# Patient Record
Sex: MALE | Race: WHITE | Employment: UNEMPLOYED | URBAN - METROPOLITAN AREA
[De-identification: names, ages, dates, MRNs, and addresses within clinical notes are randomized per-mention and may not be internally consistent; named-entity substitution may affect disease eponyms.]

---

## 2017-02-09 ENCOUNTER — TELEPHONE (OUTPATIENT)
Dept: FAMILY MEDICINE CLINIC | Age: 72
End: 2017-02-09

## 2017-02-09 DIAGNOSIS — Z01.00 ROUTINE EYE EXAM: Primary | ICD-10-CM

## 2017-02-09 DIAGNOSIS — Z98.42 S/P CATARACT EXTRACTION, LEFT: ICD-10-CM

## 2017-04-17 ENCOUNTER — OFFICE VISIT (OUTPATIENT)
Dept: FAMILY MEDICINE CLINIC | Age: 72
End: 2017-04-17

## 2017-04-17 VITALS
RESPIRATION RATE: 12 BRPM | DIASTOLIC BLOOD PRESSURE: 70 MMHG | TEMPERATURE: 97.2 F | HEIGHT: 66 IN | HEART RATE: 72 BPM | BODY MASS INDEX: 34.07 KG/M2 | SYSTOLIC BLOOD PRESSURE: 138 MMHG | WEIGHT: 212 LBS

## 2017-04-17 DIAGNOSIS — L30.8 OTHER ECZEMA: ICD-10-CM

## 2017-04-17 DIAGNOSIS — G20 PARKINSON'S DISEASE (HCC): ICD-10-CM

## 2017-04-17 DIAGNOSIS — E03.9 ACQUIRED HYPOTHYROIDISM: Primary | ICD-10-CM

## 2017-04-17 DIAGNOSIS — Z23 NEED FOR PNEUMOCOCCAL VACCINATION: ICD-10-CM

## 2017-04-17 DIAGNOSIS — Z86.718 PERSONAL HISTORY OF DVT (DEEP VEIN THROMBOSIS): ICD-10-CM

## 2017-04-17 PROCEDURE — 99213 OFFICE O/P EST LOW 20 MIN: CPT | Performed by: FAMILY MEDICINE

## 2017-04-17 PROCEDURE — 90732 PPSV23 VACC 2 YRS+ SUBQ/IM: CPT | Performed by: FAMILY MEDICINE

## 2017-04-17 PROCEDURE — G0009 ADMIN PNEUMOCOCCAL VACCINE: HCPCS | Performed by: FAMILY MEDICINE

## 2017-04-17 RX ORDER — MULTIVITAMIN WITH IRON
100 TABLET ORAL DAILY
COMMUNITY

## 2017-04-17 RX ORDER — MULTIVIT-MIN/FA/LYCOPEN/LUTEIN .4-300-25
1 TABLET ORAL DAILY
COMMUNITY

## 2017-04-17 RX ORDER — TRIAMCINOLONE ACETONIDE 1 MG/G
CREAM TOPICAL
Qty: 28.4 G | Refills: 2 | Status: SHIPPED | OUTPATIENT
Start: 2017-04-17

## 2017-04-26 ASSESSMENT — ENCOUNTER SYMPTOMS
COUGH: 0
SHORTNESS OF BREATH: 0

## 2017-08-14 ENCOUNTER — OFFICE VISIT (OUTPATIENT)
Dept: FAMILY MEDICINE CLINIC | Age: 72
End: 2017-08-14

## 2017-08-14 VITALS
DIASTOLIC BLOOD PRESSURE: 78 MMHG | WEIGHT: 214 LBS | HEART RATE: 71 BPM | BODY MASS INDEX: 34.39 KG/M2 | TEMPERATURE: 97.8 F | SYSTOLIC BLOOD PRESSURE: 122 MMHG | RESPIRATION RATE: 14 BRPM | HEIGHT: 66 IN

## 2017-08-14 DIAGNOSIS — R07.89 ATYPICAL CHEST PAIN: ICD-10-CM

## 2017-08-14 DIAGNOSIS — I82.501 CHRONIC DEEP VEIN THROMBOSIS (DVT) OF RIGHT LOWER EXTREMITY, UNSPECIFIED VEIN (HCC): ICD-10-CM

## 2017-08-14 DIAGNOSIS — E03.9 ACQUIRED HYPOTHYROIDISM: Primary | ICD-10-CM

## 2017-08-14 DIAGNOSIS — G20 PARKINSON'S DISEASE (HCC): ICD-10-CM

## 2017-08-14 PROCEDURE — 93000 ELECTROCARDIOGRAM COMPLETE: CPT | Performed by: FAMILY MEDICINE

## 2017-08-14 PROCEDURE — 99213 OFFICE O/P EST LOW 20 MIN: CPT | Performed by: FAMILY MEDICINE

## 2017-08-14 RX ORDER — MULTIVITAMIN WITH IRON
250 TABLET ORAL DAILY
COMMUNITY
End: 2018-08-17 | Stop reason: ALTCHOICE

## 2017-08-14 ASSESSMENT — PATIENT HEALTH QUESTIONNAIRE - PHQ9
SUM OF ALL RESPONSES TO PHQ QUESTIONS 1-9: 0
SUM OF ALL RESPONSES TO PHQ9 QUESTIONS 1 & 2: 0
1. LITTLE INTEREST OR PLEASURE IN DOING THINGS: 0
2. FEELING DOWN, DEPRESSED OR HOPELESS: 0

## 2017-08-27 ASSESSMENT — ENCOUNTER SYMPTOMS
ABDOMINAL PAIN: 0
BACK PAIN: 1
COUGH: 0
CHEST TIGHTNESS: 1
BLOOD IN STOOL: 0
SHORTNESS OF BREATH: 0

## 2017-09-27 ENCOUNTER — OFFICE VISIT (OUTPATIENT)
Dept: FAMILY MEDICINE CLINIC | Age: 72
End: 2017-09-27

## 2017-09-27 VITALS
RESPIRATION RATE: 14 BRPM | BODY MASS INDEX: 34.72 KG/M2 | TEMPERATURE: 97.2 F | WEIGHT: 216 LBS | DIASTOLIC BLOOD PRESSURE: 78 MMHG | HEART RATE: 71 BPM | SYSTOLIC BLOOD PRESSURE: 130 MMHG | HEIGHT: 66 IN

## 2017-09-27 DIAGNOSIS — G20 PARKINSON'S DISEASE (HCC): Primary | ICD-10-CM

## 2017-09-27 DIAGNOSIS — E03.9 ACQUIRED HYPOTHYROIDISM: ICD-10-CM

## 2017-09-27 DIAGNOSIS — Z12.5 PROSTATE CANCER SCREENING: ICD-10-CM

## 2017-09-27 DIAGNOSIS — E53.8 B12 DEFICIENCY: ICD-10-CM

## 2017-09-27 PROCEDURE — 99213 OFFICE O/P EST LOW 20 MIN: CPT | Performed by: FAMILY MEDICINE

## 2017-09-27 NOTE — PROGRESS NOTES
(MAGNESIUM-OXIDE) 250 MG TABS tablet Take 250 mg by mouth daily      triamcinolone (KENALOG) 0.1 % cream Apply topically 2 times daily. 28.4 g 2    [DISCONTINUED] levothyroxine (SYNTHROID) 88 MCG tablet Take 1 tab qod. Yenny Jemima w/ 100mcg] 45 tablet 3    meclizine (ANTIVERT) 12.5 MG tablet Take 1 or 2 tabs bid pc for dizziness [for dizziness] 60 tablet 2    DHA-EPA-VITAMIN E PO Take 1 capsule by mouth       No facility-administered encounter medications on file as of 9/27/2017. Orders Placed This Encounter   Procedures    Psa screening     Standing Status:   Future     Standing Expiration Date:   9/27/2018    Comprehensive Metabolic Panel     Standing Status:   Future     Standing Expiration Date:   9/27/2018    Vitamin B12     Standing Status:   Future     Standing Expiration Date:   9/27/2018     No orders of the defined types were placed in this encounter. Medications Discontinued During This Encounter   Medication Reason    levothyroxine (SYNTHROID) 88 MCG tablet      Return in about 3 months (around 12/27/2017) for thyroid. Call or return to clinic prn if these symptoms worsen or fail to improve as anticipated.       Chanell Clarke, DO

## 2017-09-27 NOTE — MR AVS SNAPSHOT
After Visit Summary             Catrina Collins   2017 9:00 AM   Office Visit    Description:  Male : 1945   Provider:  Chanell Clarke DO   Department:  [facility]              Your Follow-Up and Future Appointments         Below is a list of your follow-up and future appointments. This may not be a complete list as you may have made appointments directly with providers that we are not aware of or your providers may have made some for you. Please call your providers to confirm appointments. It is important to keep your appointments. Please bring your current insurance card, photo ID, co-pay, and all medication bottles to your appointment. If self-pay, payment is expected at the time of service. Your To-Do List     Future Appointments Provider Department Dept Phone    2017 9:30 AM DO Ari[facility] [phone]    Please arrive 15 minutes prior to appointment, bring photo ID and insurance card. Future Orders Complete By Expires    Psa screening [[id number] Custom]  2018    Vitamin B12 [LAB67 Custom]  2018    Comprehensive Metabolic Panel [GZV07 Custom]  2018    Follow-Up    Return in about 3 months (around 2017). Information from Your Visit        Department     Name Address Phone Fax    [facility] PCP [address] [facility]. [facility] AT [facility] [address] [phone]      You Were Seen for:         Comments    Parkinson's disease [facility])   [[id number]]         Vital Signs     Blood Pressure Pulse Temperature Respirations Height Weight    130/78 71 97.2 °F (36.2 °C) (Temporal) 14 5' 6\" (1.676 m) 216 lb (98 kg)    Body Mass Index Smoking Status                34.86 kg/m2 Never Smoker          Additional Information about your Body Mass Index (BMI)           Your BMI as listed above is considered obese (30 or more).  BMI is an estimate of body fat, calculated from your height and weight. The higher your BMI, the greater your risk of heart disease, high blood pressure, type 2 diabetes, stroke, gallstones, arthritis, sleep apnea, and certain cancers. BMI is not perfect. It may overestimate body fat in athletes and people who are more muscular. Even a small weight loss (between 5 and 10 percent of your current weight) by decreasing your calorie intake and becoming more physically active will help lower your risk of developing or worsening diseases associated with obesity. Learn more at: [employer]             Medications and Orders      Your Current Medications Are              Misc Natural Products (FIBER 7 PO) Take by mouth    Coenzyme Q10 (COQ-10) 100 MG CAPS Take by mouth    COCONUT OIL PO Take by mouth    levothyroxine (SYNTHROID) 100 MCG tablet TAKE ONE TABLET BY MOUTH DAILY    Pyridoxine HCl (VITAMIN B-6) 100 MG tablet Take 100 mg by mouth daily    Multiple Vitamins-Minerals (CENTRUM SILVER ADULT 50+) TABS Take 1 tablet by mouth daily    selegiline (ELDEPRYL) 5 MG capsule Take 1 capsule by mouth daily    levothyroxine (SYNTHROID) 88 MCG tablet Take 1 tab qod. Amelia [place] w/ 100mcg]    amantadine (SYMMETREL) 100 MG capsule Take 1 capsule by mouth 2 times daily    aspirin 81 MG tablet Take 81 mg by mouth 2 times daily     CYANOCOBALAMIN PO Take by mouth    Omega-3 Fatty Acids (FISH OIL) 1000 MG CAPS Take 1 capsule by mouth    magnesium (MAGNESIUM-OXIDE) 250 MG TABS tablet Take 250 mg by mouth daily    triamcinolone (KENALOG) 0.1 % cream Apply topically 2 times daily.     meclizine (ANTIVERT) 12.5 MG tablet Take 1 or 2 tabs bid pc for dizziness [for dizziness]    DHA-EPA-VITAMIN E PO Take 1 capsule by mouth      Allergies              Amoxicillin Shortness Of Breath, Swelling    Quinine Derivatives Shortness Of Breath, Swelling         Additional Information        Basic Information later time if you forget your password. 8. Enter your e-mail address. You will receive e-mail notification when new information is available in [address] 9. Click Sign Up. You can now view your medical record. Additional Information  If you have questions, please contact the physician practice where you receive care. Remember, [employer] is NOT to be used for urgent needs. For medical emergencies, dial 911. For questions regarding your [employer] account call [phone]. If you have a clinical question, please call your doctor's office.

## 2017-10-05 ASSESSMENT — ENCOUNTER SYMPTOMS
SHORTNESS OF BREATH: 0
BACK PAIN: 1
BLOOD IN STOOL: 0
ABDOMINAL PAIN: 0
COUGH: 0
NAUSEA: 0

## 2017-12-18 DIAGNOSIS — Z12.5 PROSTATE CANCER SCREENING: ICD-10-CM

## 2017-12-18 DIAGNOSIS — G20 PARKINSON'S DISEASE (HCC): ICD-10-CM

## 2017-12-18 DIAGNOSIS — E53.8 B12 DEFICIENCY: ICD-10-CM

## 2017-12-18 LAB
ALBUMIN SERPL-MCNC: 4.3 G/DL (ref 3.9–4.9)
ALP BLD-CCNC: 48 U/L (ref 35–104)
ALT SERPL-CCNC: 45 U/L (ref 0–41)
ANION GAP SERPL CALCULATED.3IONS-SCNC: 17 MEQ/L (ref 7–13)
AST SERPL-CCNC: 44 U/L (ref 0–40)
BILIRUB SERPL-MCNC: 1 MG/DL (ref 0–1.2)
BUN BLDV-MCNC: 15 MG/DL (ref 8–23)
CALCIUM SERPL-MCNC: 9.1 MG/DL (ref 8.6–10.2)
CHLORIDE BLD-SCNC: 101 MEQ/L (ref 98–107)
CO2: 25 MEQ/L (ref 22–29)
CREAT SERPL-MCNC: 0.69 MG/DL (ref 0.7–1.2)
GFR AFRICAN AMERICAN: >60
GFR NON-AFRICAN AMERICAN: >60
GLOBULIN: 3.2 G/DL (ref 2.3–3.5)
GLUCOSE BLD-MCNC: 96 MG/DL (ref 74–109)
POTASSIUM SERPL-SCNC: 5.2 MEQ/L (ref 3.5–5.1)
PROSTATE SPECIFIC ANTIGEN: 0.45 NG/ML (ref 0–6.22)
SODIUM BLD-SCNC: 143 MEQ/L (ref 132–144)
TOTAL PROTEIN: 7.5 G/DL (ref 6.4–8.1)
VITAMIN B-12: 855 PG/ML (ref 211–946)

## 2017-12-20 ENCOUNTER — OFFICE VISIT (OUTPATIENT)
Dept: FAMILY MEDICINE CLINIC | Age: 72
End: 2017-12-20

## 2017-12-20 VITALS — BODY MASS INDEX: 35.2 KG/M2 | HEIGHT: 66 IN | WEIGHT: 219 LBS

## 2017-12-20 DIAGNOSIS — G20 PARKINSON'S DISEASE (HCC): ICD-10-CM

## 2017-12-20 DIAGNOSIS — R60.9 PERIPHERAL EDEMA: ICD-10-CM

## 2017-12-20 DIAGNOSIS — B96.89 ACUTE BACTERIAL SINUSITIS: ICD-10-CM

## 2017-12-20 DIAGNOSIS — E03.9 ACQUIRED HYPOTHYROIDISM: Primary | ICD-10-CM

## 2017-12-20 DIAGNOSIS — J01.90 ACUTE BACTERIAL SINUSITIS: ICD-10-CM

## 2017-12-20 DIAGNOSIS — I87.2 CHRONIC VENOUS INSUFFICIENCY: ICD-10-CM

## 2017-12-20 PROCEDURE — 99213 OFFICE O/P EST LOW 20 MIN: CPT | Performed by: FAMILY MEDICINE

## 2017-12-20 RX ORDER — TRIAMTERENE AND HYDROCHLOROTHIAZIDE 37.5; 25 MG/1; MG/1
TABLET ORAL
Qty: 15 TABLET | Refills: 3 | Status: SHIPPED | OUTPATIENT
Start: 2017-12-20 | End: 2018-05-09

## 2017-12-20 RX ORDER — SULFAMETHOXAZOLE AND TRIMETHOPRIM 800; 160 MG/1; MG/1
1 TABLET ORAL 2 TIMES DAILY
Qty: 14 TABLET | Refills: 0 | Status: SHIPPED | OUTPATIENT
Start: 2017-12-20 | End: 2017-12-27

## 2017-12-20 NOTE — PROGRESS NOTES
Subjective:      Patient ID: Michael Green is a 74 y.o. male.     HPI    Review of Systems    Objective:   Physical Exam    Assessment:            Plan:

## 2017-12-20 NOTE — PROGRESS NOTES
Subjective  Ned Baron, 74 y.o. male presents today with:  Chief Complaint   Patient presents with    Hypothyroidism     f/u     Skin Problem     sore on anterior left leg       HPI  Patient presents today for a follow up for hypothyroidism. Has sl more bilat lower leg swelling[NO clark/palp/cp]  Small red area in left lower shin  More coryza and sinus ha  Needs new neuro--no increase leg weakness on 2-parkinsons rxs  Alt 88/100 LT4-no s-e  Rev/rxs; No abuse nor side effects on meds   No unilateral leg swelling/pain  Review of Systems   Constitutional: Negative for fatigue and fever. HENT: Positive for congestion, rhinorrhea, sinus pressure and sore throat. Negative for tinnitus, trouble swallowing and voice change. Respiratory: Negative for cough, shortness of breath and wheezing. Cardiovascular: Positive for leg swelling (bilat). Negative for chest pain and palpitations. Gastrointestinal: Negative for abdominal pain, blood in stool and nausea. Genitourinary: Positive for frequency and urgency. Negative for dysuria and flank pain. Musculoskeletal: Positive for arthralgias, back pain and gait problem. Skin: Positive for rash. Neurological: Positive for weakness. Negative for light-headedness and headaches.        Allergies   Allergen Reactions    Amoxicillin Shortness Of Breath and Swelling    Quinine Derivatives Shortness Of Breath and Swelling       Past Medical History:   Diagnosis Date    History of hypothyroidism     Hypothyroidism     Parkinson's disease ([address]     Right leg DVT ([address] 7/14/15    Vertigo 11/10/2014     Past Surgical History:   Procedure Laterality Date    CARPAL TUNNEL RELEASE  1996    [facility]    [facility]    COLONOSCOPY  10/17/2016    [facility] MD  5 YEARS    TONSILLECTOMY AND ADENOIDECTOMY      CHILDHOOD    VASECTOMY  1970     Social History     Social History    Marital status:      Spouse name: N/A    Number of children: N/A    Years of education: N/A     Occupational History    Not on file. Social History Main Topics    Smoking status: Never Smoker    Smokeless tobacco: Never Used    Alcohol use No    Drug use: No    Sexual activity: Not on file     Other Topics Concern    Not on file     Social History Narrative    No narrative on file     Family History   Problem Relation Age of Onset    Alzheimer's Disease Mother     Diabetes Father     Other Father      GOUT    Heart Disease Father     High Blood Pressure Father           Current Outpatient Prescriptions on File Prior to Visit   Medication Sig Dispense Refill    Misc Natural Products (FIBER 7 PO) Take by mouth      Coenzyme Q10 (COQ-10) 100 MG CAPS Take by mouth      COCONUT OIL PO Take by mouth      magnesium (MAGNESIUM-OXIDE) 250 MG TABS tablet Take 250 mg by mouth daily      levothyroxine (SYNTHROID) 100 MCG tablet TAKE ONE TABLET BY MOUTH DAILY 60 tablet 3    Pyridoxine HCl (VITAMIN B-6) 100 MG tablet Take 100 mg by mouth daily      amantadine (SYMMETREL) 100 MG capsule Take 1 capsule by mouth 2 times daily 180 capsule 3    DHA-EPA-VITAMIN E PO Take 1 capsule by mouth      aspirin 81 MG tablet Take 81 mg by mouth 2 times daily       CYANOCOBALAMIN PO Take by mouth      Omega-3 Fatty Acids (FISH OIL) 1000 MG CAPS Take 1 capsule by mouth      Multiple Vitamins-Minerals (CENTRUM SILVER ADULT 50+) TABS Take 1 tablet by mouth daily      triamcinolone (KENALOG) 0.1 % cream Apply topically 2 times daily. 28.4 g 2    meclizine (ANTIVERT) 12.5 MG tablet Take 1 or 2 tabs bid pc for dizziness [for dizziness] 60 tablet 2     No current facility-administered medications on file prior to visit. Objective    Vitals:    12/20/17 0906   Weight: 219 lb (99.3 kg)   Height: 5' 6\" (1.676 m)     Physical Exam   Constitutional: He is oriented to person, place, and time and well-developed, well-nourished, and in no distress. No distress. HENT:   Right Ear: Ear canal normal. Tympanic membrane is retracted. Tympanic membrane is not injected. Decreased hearing is noted. Left Ear: Ear canal normal. Tympanic membrane is retracted. Tympanic membrane is not injected. Decreased hearing is noted. Nose: Mucosal edema, rhinorrhea and septal deviation present. Right sinus exhibits maxillary sinus tenderness. Mouth/Throat: No oral lesions. Posterior oropharyngeal erythema present. No oropharyngeal exudate. Eyes: No scleral icterus. Neck: Normal range of motion. Neck supple. Carotid bruit is not present. No neck rigidity. No thyroid mass and no thyromegaly present. Cardiovascular: Normal rate, regular rhythm, S1 normal, S2 normal and normal heart sounds. No extrasystoles are present. No murmur heard. Pulses:       Dorsalis pedis pulses are 2+ on the right side, and 2+ on the left side. Posterior tibial pulses are 2+ on the right side, and 2+ on the left side. Pulmonary/Chest: Effort normal and breath sounds normal.   Musculoskeletal: He exhibits edema (1/4 pitting edema both lower legs-neg homans). Neurological: He is alert and oriented to person, place, and time. He has normal sensation and intact cranial nerves. Gait (slow-steady) abnormal.   Skin: Petechiae (from venous madi-nonblanching and NO warmth-homans) noted. He is not diaphoretic. Psychiatric: Mood, memory and affect normal.     Orders Only on 12/18/2017   Component Date Value Ref Range Status    PSA 12/18/2017 0.45  0.00 - 6.22 ng/mL Final    Comment: When the Total PSA is between 3.00 and 10.00 ng/mL, consider  requesting a Free PSA to aid in diagnosis.  Sodium 12/18/2017 143  132 - 144 mEq/L Final    Comment: Revert to previous reference range. Effective:  12/14/2017      Potassium 12/18/2017 5.2* 3.5 - 5.1 mEq/L Final    Comment: Revert to previous reference range.   Effective:  12/14/2017      Chloride 12/18/2017 101  98 - 107 mEq/L Final effects and use reviewed-call if ineffective or problems ; Outpatient Encounter Prescriptions as of 2017   Medication Sig Dispense Refill    [] sulfamethoxazole-trimethoprim (BACTRIM DS;SEPTRA DS) 800-160 MG per tablet Take 1 tablet by mouth 2 times daily for 7 days Take with food 14 tablet 0    triamterene-hydrochlorothiazide (MAXZIDE-25) 37.5-25 MG per tablet Take 1 tab qod for swelling 15 tablet 3    Misc Natural Products (FIBER 7 PO) Take by mouth      Coenzyme Q10 (COQ-10) 100 MG CAPS Take by mouth      COCONUT OIL PO Take by mouth      magnesium (MAGNESIUM-OXIDE) 250 MG TABS tablet Take 250 mg by mouth daily      levothyroxine (SYNTHROID) 100 MCG tablet TAKE ONE TABLET BY MOUTH DAILY 60 tablet 3    Pyridoxine HCl (VITAMIN B-6) 100 MG tablet Take 100 mg by mouth daily      amantadine (SYMMETREL) 100 MG capsule Take 1 capsule by mouth 2 times daily 180 capsule 3    [DISCONTINUED] selegiline (ELDEPRYL) 5 MG capsule Take 1 capsule by mouth daily 90 capsule 3    DHA-EPA-VITAMIN E PO Take 1 capsule by mouth      aspirin 81 MG tablet Take 81 mg by mouth 2 times daily       CYANOCOBALAMIN PO Take by mouth      Omega-3 Fatty Acids (FISH OIL) 1000 MG CAPS Take 1 capsule by mouth      Multiple Vitamins-Minerals (CENTRUM SILVER ADULT 50+) TABS Take 1 tablet by mouth daily      triamcinolone (KENALOG) 0.1 % cream Apply topically 2 times daily. 28.4 g 2    meclizine (ANTIVERT) 12.5 MG tablet Take 1 or 2 tabs bid pc for dizziness [for dizziness] 60 tablet 2     No facility-administered encounter medications on file as of 2017. Call if legs worse;reduce diet-salt  Qod diuretic. ..   Lab next  Orders Placed This Encounter   Procedures    TSH without Reflex     Standing Status:   Future     Standing Expiration Date:   2018    Basic Metabolic Panel     Standing Status:   Future     Standing Expiration Date:   2018     Orders Placed This Encounter   Medications   

## 2017-12-27 DIAGNOSIS — G20 PARKINSON'S DISEASE (HCC): ICD-10-CM

## 2017-12-27 RX ORDER — SELEGILINE HYDROCHLORIDE 5 MG/1
5 CAPSULE ORAL DAILY
Qty: 90 CAPSULE | Refills: 3 | OUTPATIENT
Start: 2017-12-27

## 2017-12-28 ASSESSMENT — ENCOUNTER SYMPTOMS
NAUSEA: 0
WHEEZING: 0
VOICE CHANGE: 0
COUGH: 0
SINUS PRESSURE: 1
SORE THROAT: 1
ABDOMINAL PAIN: 0
BACK PAIN: 1
SHORTNESS OF BREATH: 0
TROUBLE SWALLOWING: 0
RHINORRHEA: 1
BLOOD IN STOOL: 0

## 2018-01-03 ENCOUNTER — TELEPHONE (OUTPATIENT)
Dept: FAMILY MEDICINE CLINIC | Age: 73
End: 2018-01-03

## 2018-01-03 DIAGNOSIS — E03.9 ACQUIRED HYPOTHYROIDISM: ICD-10-CM

## 2018-01-03 RX ORDER — LEVOTHYROXINE SODIUM 88 UG/1
TABLET ORAL
Qty: 45 TABLET | Refills: 3 | Status: SHIPPED | OUTPATIENT
Start: 2018-01-03

## 2018-01-03 NOTE — TELEPHONE ENCOUNTER
Pt wife requesting refill of Levothyroxine 88 mcg. She was advised chart states 100 mcg but she states Doctor has him taking both strengths.   Send to [employer] [facility]

## 2018-01-18 DIAGNOSIS — E03.9 ACQUIRED HYPOTHYROIDISM: ICD-10-CM

## 2018-01-18 DIAGNOSIS — R60.9 PERIPHERAL EDEMA: ICD-10-CM

## 2018-01-18 LAB
ANION GAP SERPL CALCULATED.3IONS-SCNC: 15 MEQ/L (ref 7–13)
BUN BLDV-MCNC: 19 MG/DL (ref 8–23)
CALCIUM SERPL-MCNC: 9.1 MG/DL (ref 8.6–10.2)
CHLORIDE BLD-SCNC: 100 MEQ/L (ref 98–107)
CO2: 26 MEQ/L (ref 22–29)
CREAT SERPL-MCNC: 0.88 MG/DL (ref 0.7–1.2)
GFR AFRICAN AMERICAN: >60
GFR NON-AFRICAN AMERICAN: >60
GLUCOSE BLD-MCNC: 105 MG/DL (ref 74–109)
POTASSIUM SERPL-SCNC: 4.9 MEQ/L (ref 3.5–5.1)
SODIUM BLD-SCNC: 141 MEQ/L (ref 132–144)
TSH SERPL DL<=0.05 MIU/L-ACNC: 4.26 UIU/ML (ref 0.27–4.2)

## 2018-01-22 DIAGNOSIS — G20 PARKINSON'S DISEASE (HCC): ICD-10-CM

## 2018-01-22 RX ORDER — AMANTADINE HYDROCHLORIDE 100 MG/1
100 CAPSULE, GELATIN COATED ORAL 2 TIMES DAILY
Qty: 180 CAPSULE | Refills: 3 | Status: SHIPPED | OUTPATIENT
Start: 2018-01-22 | End: 2019-01-23 | Stop reason: SDUPTHER

## 2018-02-19 ENCOUNTER — TELEPHONE (OUTPATIENT)
Dept: FAMILY MEDICINE CLINIC | Age: 73
End: 2018-02-19

## 2018-02-19 DIAGNOSIS — Z98.49 STATUS POST CATARACT EXTRACTION, UNSPECIFIED LATERALITY: Primary | ICD-10-CM

## 2018-02-19 NOTE — TELEPHONE ENCOUNTER
Igor Angel from Dr Gil Ortez office called and stated that the pt needs a referral for optometry. Please fax to [phone] Attention to Igor Angel. Please approve or deny.    Thank you

## 2018-02-27 ENCOUNTER — OFFICE VISIT (OUTPATIENT)
Dept: FAMILY MEDICINE CLINIC | Age: 73
End: 2018-02-27
Payer: MEDICARE

## 2018-02-27 VITALS
DIASTOLIC BLOOD PRESSURE: 70 MMHG | BODY MASS INDEX: 33.59 KG/M2 | HEIGHT: 66 IN | SYSTOLIC BLOOD PRESSURE: 104 MMHG | RESPIRATION RATE: 14 BRPM | WEIGHT: 209 LBS | HEART RATE: 96 BPM | TEMPERATURE: 97.9 F

## 2018-02-27 DIAGNOSIS — R60.9 PERIPHERAL EDEMA: ICD-10-CM

## 2018-02-27 DIAGNOSIS — E03.9 ACQUIRED HYPOTHYROIDISM: Primary | ICD-10-CM

## 2018-02-27 DIAGNOSIS — I82.431 DEEP VEIN THROMBOSIS (DVT) OF POPLITEAL VEIN OF RIGHT LOWER EXTREMITY, UNSPECIFIED CHRONICITY (HCC): ICD-10-CM

## 2018-02-27 DIAGNOSIS — G20 PRIMARY PARKINSONISM (HCC): ICD-10-CM

## 2018-02-27 PROCEDURE — 99213 OFFICE O/P EST LOW 20 MIN: CPT | Performed by: FAMILY MEDICINE

## 2018-02-27 RX ORDER — SPIRONOLACTONE 25 MG/1
TABLET ORAL
Qty: 30 TABLET | Refills: 3 | Status: SHIPPED | OUTPATIENT
Start: 2018-02-27

## 2018-03-06 ASSESSMENT — ENCOUNTER SYMPTOMS
NAUSEA: 0
DIARRHEA: 0
CHEST TIGHTNESS: 0
CONSTIPATION: 0
SHORTNESS OF BREATH: 0
COUGH: 0
ABDOMINAL PAIN: 0

## 2018-05-02 ENCOUNTER — OFFICE VISIT (OUTPATIENT)
Dept: FAMILY MEDICINE CLINIC | Age: 73
End: 2018-05-02
Payer: MEDICARE

## 2018-05-02 VITALS
HEIGHT: 66 IN | TEMPERATURE: 97.5 F | DIASTOLIC BLOOD PRESSURE: 82 MMHG | RESPIRATION RATE: 16 BRPM | WEIGHT: 204 LBS | SYSTOLIC BLOOD PRESSURE: 138 MMHG | BODY MASS INDEX: 32.78 KG/M2 | HEART RATE: 74 BPM

## 2018-05-02 DIAGNOSIS — G20 PARKINSON'S DISEASE (HCC): Primary | ICD-10-CM

## 2018-05-02 DIAGNOSIS — E03.9 ACQUIRED HYPOTHYROIDISM: ICD-10-CM

## 2018-05-02 PROCEDURE — 99213 OFFICE O/P EST LOW 20 MIN: CPT | Performed by: FAMILY MEDICINE

## 2018-05-09 ASSESSMENT — ENCOUNTER SYMPTOMS
ABDOMINAL PAIN: 0
NAUSEA: 0
SHORTNESS OF BREATH: 0
BLOOD IN STOOL: 0
BACK PAIN: 1

## 2018-07-15 RX ORDER — LEVOTHYROXINE SODIUM 0.1 MG/1
TABLET ORAL
Qty: 90 TABLET | Refills: 3 | Status: SHIPPED | OUTPATIENT
Start: 2018-07-15

## 2018-07-15 NOTE — TELEPHONE ENCOUNTER
Pharmacy requests refill on medication. Please approve or deny this request.  Last seen by you on 5/2/18  . No future appointments.

## 2018-08-17 ENCOUNTER — OFFICE VISIT (OUTPATIENT)
Dept: FAMILY MEDICINE CLINIC | Age: 73
End: 2018-08-17
Payer: MEDICARE

## 2018-08-17 VITALS
WEIGHT: 203.2 LBS | HEART RATE: 76 BPM | BODY MASS INDEX: 31.89 KG/M2 | RESPIRATION RATE: 20 BRPM | TEMPERATURE: 97.6 F | DIASTOLIC BLOOD PRESSURE: 74 MMHG | SYSTOLIC BLOOD PRESSURE: 132 MMHG | HEIGHT: 67 IN

## 2018-08-17 DIAGNOSIS — I82.431 DEEP VEIN THROMBOSIS (DVT) OF POPLITEAL VEIN OF RIGHT LOWER EXTREMITY, UNSPECIFIED CHRONICITY (HCC): ICD-10-CM

## 2018-08-17 DIAGNOSIS — G20 PARKINSON'S DISEASE (HCC): ICD-10-CM

## 2018-08-17 DIAGNOSIS — R73.03 PRE-DIABETES: ICD-10-CM

## 2018-08-17 DIAGNOSIS — I87.2 VENOUS STASIS DERMATITIS OF BOTH LOWER EXTREMITIES: Primary | ICD-10-CM

## 2018-08-17 DIAGNOSIS — Z00.00 ENCOUNTER FOR MEDICAL EXAMINATION TO ESTABLISH CARE: ICD-10-CM

## 2018-08-17 LAB — HBA1C MFR BLD: 5.6 %

## 2018-08-17 PROCEDURE — 99204 OFFICE O/P NEW MOD 45 MIN: CPT | Performed by: FAMILY MEDICINE

## 2018-08-17 PROCEDURE — 83036 HEMOGLOBIN GLYCOSYLATED A1C: CPT | Performed by: FAMILY MEDICINE

## 2018-08-17 ASSESSMENT — ENCOUNTER SYMPTOMS
CONSTIPATION: 0
SORE THROAT: 0
ABDOMINAL PAIN: 0
COUGH: 0
WHEEZING: 0
DIARRHEA: 0
RHINORRHEA: 0
SHORTNESS OF BREATH: 0

## 2018-08-17 ASSESSMENT — PATIENT HEALTH QUESTIONNAIRE - PHQ9
SUM OF ALL RESPONSES TO PHQ9 QUESTIONS 1 & 2: 0
SUM OF ALL RESPONSES TO PHQ QUESTIONS 1-9: 0
SUM OF ALL RESPONSES TO PHQ QUESTIONS 1-9: 0
2. FEELING DOWN, DEPRESSED OR HOPELESS: 0
1. LITTLE INTEREST OR PLEASURE IN DOING THINGS: 0

## 2018-08-17 NOTE — PROGRESS NOTES
[address] [facility] PRIMARY CARE  [facility] [place] [address]  Dept: [phone]  Dept Fax: : [phone]   Chief Complaint  Chief Complaint   Patient presents with   [address]     Former pt of  Dr Jagdeep Flores. Wanted second opinion in regards to 2nd CC. Daughter suggested  Justine Comes. Lives in area. Pt has hx of Parkinson's and hypothroidism. Pt eats healthy and exercises daily. Pt is hard of hearing    Leg Swelling     Ongoing x1 year. Bilateral leg swelling. Blood clot found in right leg x2yrs. Pt has a sore on left leg. Bilateral leg numbness and tingling. Would like looked at. HPI:  74 y.o. male who presents for LLE sore:    LLE sore: has had a sore that comes and goes and discoloration of the foot for more than 1 year; No pain; has been looking better lately; Says he has been on spironolactone as a water pill and that the swelling is the cause of the ulcer; he wonders if this is caused by diabetes and wants a second opinion about getting checked. Hx of RLE blood clot 2 years ago and was temporarily on anticoagulants. Gets chronic swelling in the b/l LEs. Notes numbness of the feet at times; hx of back injury but never had surgery.     Parkinson's Disease: has been on amantadine and selegiline; had seen Dr. Pao Cleveland (neuro) and been on these meds for years; says he isn't sure if the medicine is working but notices worsening when he stopped taking them in the past.       Past Medical History:   Diagnosis Date    Hearing loss     History of hypothyroidism     Hypothyroidism     Parkinson's disease ([address]     Right leg DVT ([address] 7/14/15    Vertigo 11/10/2014     Past Surgical History:   Procedure Laterality Date    CARPAL TUNNEL RELEASE  1996    [facility]    [facility]    COLONOSCOPY  10/17/2016    [facility] DE LA O MD  5 YEARS    TONSILLECTOMY AND ADENOIDECTOMY      CHILDHOOD    VASECTOMY  1970     Social History     Social History    Marital status:      Spouse name: N/A    Number of children: N/A    Years of education: N/A     Occupational History    Not on file. Social History Main Topics    Smoking status: Never Smoker    Smokeless tobacco: Never Used    Alcohol use No    Drug use: No    Sexual activity: Not on file     Other Topics Concern    Not on file     Social History Narrative    No narrative on file     Allergies   Allergen Reactions    Amoxicillin Shortness Of Breath and Swelling    Quinine Derivatives Shortness Of Breath and Swelling    Maxzide [Hydrochlorothiazide W-Triamterene] Other (See Comments)     dizziness     Current Outpatient Prescriptions   Medication Sig Dispense Refill    levothyroxine (SYNTHROID) 100 MCG tablet TAKE ONE TABLET BY MOUTH ONCE DAILY 90 tablet 3    spironolactone (ALDACTONE) 25 MG tablet Take 1 daily before supper 30 tablet 3    amantadine (SYMMETREL) 100 MG capsule Take 1 capsule by mouth 2 times daily 180 capsule 3    levothyroxine (SYNTHROID) 88 MCG tablet Take 1 tab qod. [place] w/ 100mcg] 45 tablet 3    selegiline (ELDEPRYL) 5 MG capsule TAKE ONE CAPSULE BY MOUTH ONCE DAILY 90 capsule 3    Multiple Vitamins-Minerals (CENTRUM SILVER ADULT 50+) TABS Take 1 tablet by mouth daily      triamcinolone (KENALOG) 0.1 % cream Apply topically 2 times daily. 28.4 g 2    meclizine (ANTIVERT) 12.5 MG tablet Take 1 or 2 tabs bid pc for dizziness [for dizziness] 60 tablet 2    aspirin 81 MG tablet Take 81 mg by mouth 2 times daily       CYANOCOBALAMIN PO Take by mouth      Omega-3 Fatty Acids (FISH OIL) 1000 MG CAPS Take 1 capsule by mouth      Pyridoxine HCl (VITAMIN B-6) 100 MG tablet Take 100 mg by mouth daily       No current facility-administered medications for this visit. ROS:  Review of Systems   Constitutional: Negative for chills and fever.    HENT: Negative for rhinorrhea and sore throat. Respiratory: Negative for cough, shortness of breath and wheezing. Gastrointestinal: Negative for abdominal pain, constipation and diarrhea. Endocrine: Negative for polydipsia and polyuria. Genitourinary: Negative for dysuria, frequency and urgency. Skin: Positive for rash and wound. Neurological: Negative for syncope, light-headedness, numbness and headaches. Psychiatric/Behavioral: Negative for sleep disturbance. The patient is not nervous/anxious. Vitals:    08/17/18 0904   BP: 132/74   Pulse: 76   Resp: 20   Temp: 97.6 °F (36.4 °C)   TempSrc: Temporal   Weight: 203 lb 3.2 oz (92.2 kg)   Height: 5' 7\" (1.702 m)       Physical exam:  Physical Exam   Constitutional: He is oriented to person, place, and time. He appears well-developed and well-nourished. No distress. HENT:   Head: Normocephalic and atraumatic. Mouth/Throat: No oropharyngeal exudate. Eyes: EOM are normal.   Neck: Normal range of motion. Cardiovascular: Normal rate, regular rhythm and normal heart sounds. No murmur heard. Pulmonary/Chest: Effort normal and breath sounds normal. No respiratory distress. He has no wheezes. Musculoskeletal:   B/l 1+ LE edema   Neurological: He is alert and oriented to person, place, and time. Skin: Skin is warm and dry. Psychiatric: He has a normal mood and affect. His behavior is normal.   Vitals reviewed. Assessment/Plan:  74 y.o. male here mainly for LLE problem:  - LLE ulcer: appears healed; A1c checked and was 5.6 today; Feet warm with palpable pulses; We discussed the possible causes of LE ulcers (Diabetes, chronic swelling, decreased blood flow) and options for work up provided. He is just happy to not be diabetic and prefers to stay on his current meds. Has compression stockings at home which he doesn't wear. Diagnosis Orders   1. Venous stasis dermatitis of both lower extremities     2. Parkinson's disease ([address]     3.  Pre-diabetes

## 2019-01-23 ENCOUNTER — TELEPHONE (OUTPATIENT)
Dept: FAMILY MEDICINE CLINIC | Age: 74
End: 2019-01-23

## 2019-01-23 DIAGNOSIS — G20 PARKINSON'S DISEASE (HCC): ICD-10-CM

## 2019-01-23 RX ORDER — AMANTADINE HYDROCHLORIDE 100 MG/1
100 CAPSULE, GELATIN COATED ORAL 2 TIMES DAILY
Qty: 180 CAPSULE | Refills: 3 | Status: SHIPPED | OUTPATIENT
Start: 2019-01-23

## 2019-01-23 RX ORDER — SELEGILINE HYDROCHLORIDE 5 MG/1
CAPSULE ORAL
Qty: 90 CAPSULE | Refills: 3 | Status: SHIPPED | OUTPATIENT
Start: 2019-01-23

## 2019-03-11 ENCOUNTER — TELEPHONE (OUTPATIENT)
Dept: FAMILY MEDICINE CLINIC | Age: 74
End: 2019-03-11

## 2019-03-11 DIAGNOSIS — Z01.00 EYE EXAM, ROUTINE: Primary | ICD-10-CM

## 2022-12-19 NOTE — PROGRESS NOTES
(MAGNESIUM-OXIDE) 250 MG TABS tablet Take 250 mg by mouth daily      triamcinolone (KENALOG) 0.1 % cream Apply topically 2 times daily. 28.4 g 2     No current facility-administered medications on file prior to visit. Objective    Vitals:    02/27/18 0954   BP: 104/70   Pulse: 96   Resp: 14   Temp: 97.9 °F (36.6 °C)   TempSrc: Temporal   Weight: 209 lb (94.8 kg)   Height: 5' 6\" (1.676 m)     Physical Exam   Constitutional: He is oriented to person, place, and time and well-developed, well-nourished, and in no distress. No distress. Eyes: No scleral icterus. Neck: Normal range of motion. Neck supple. Carotid bruit is not present. No neck rigidity. No thyroid mass and no thyromegaly present. Cardiovascular: Normal rate, regular rhythm, S1 normal, S2 normal and normal heart sounds. No extrasystoles are present. No murmur heard. Pulses:       Dorsalis pedis pulses are 1+ on the right side, and 1+ on the left side. Posterior tibial pulses are 1+ on the right side, and 1+ on the left side. Pulmonary/Chest: Effort normal and breath sounds normal.   Musculoskeletal: He exhibits edema (1/4 of lower legs and neg homans). Neurological: He is alert and oriented to person, place, and time. He has intact cranial nerves. He has a normal Straight Leg Raise Test. Gait (slow/regular) abnormal.   Skin: He is not diaphoretic.    Psychiatric: Mood, memory and affect normal.     Orders Only on 01/18/2018   Component Date Value Ref Range Status    TSH 01/18/2018 4.260* 0.270 - 4.200 uIU/mL Final    Sodium 01/18/2018 141  132 - 144 mEq/L Final    Potassium 01/18/2018 4.9  3.5 - 5.1 mEq/L Final    Chloride 01/18/2018 100  98 - 107 mEq/L Final    CO2 01/18/2018 26  22 - 29 mEq/L Final    Anion Gap 01/18/2018 15* 7 - 13 mEq/L Final    Glucose 01/18/2018 105  74 - 109 mg/dL Final    BUN 01/18/2018 19  8 - 23 mg/dL Final    CREATININE 01/18/2018 0.88  0.70 - 1.20 mg/dL Final    GFR Non-African American <-- Click to add NO significant Past Surgical History Omega-3 Fatty Acids (FISH OIL) 1000 MG CAPS Take 1 capsule by mouth      spironolactone (ALDACTONE) 25 MG tablet Take 1 daily before supper 30 tablet 3    triamterene-hydrochlorothiazide (MAXZIDE-25) 37.5-25 MG per tablet Take 1 tab qod for swelling 15 tablet 3    COCONUT OIL PO Take by mouth      magnesium (MAGNESIUM-OXIDE) 250 MG TABS tablet Take 250 mg by mouth daily      triamcinolone (KENALOG) 0.1 % cream Apply topically 2 times daily. 28.4 g 2     No facility-administered encounter medications on file as of 2/27/2018. Recheck lab in 2 mos  Declines neuro referral  Call if weaker/agrees  ; If worsening chest pain/weakness/ or SOB, get to ER; call 911 if severe or rapidly worsening symptoms. No orders of the defined types were placed in this encounter. Orders Placed This Encounter   Medications    spironolactone (ALDACTONE) 25 MG tablet     Sig: Take 1 daily before supper     Dispense:  30 tablet     Refill:  3     There are no discontinued medications. Return in about 2 months (around 4/27/2018) for early am /lab/gen-edema.  ;  Quality & Risk Score Accuracy - MEDICARE ADVANTAGE    Visit Dx:  Primary Parkinsonism ([address]  Stable based upon review of recent symptoms and exam. Continue current treatment plan and follow-up at least yearly. Additional documentation:  stable-active-no worsening/rxs  Last edited 03/06/18 21:53 EST by Junella Collet, DO          Call or return to clinic prn if these symptoms worsen or fail to improve as anticipated.       Junella Collet, DO